# Patient Record
Sex: MALE | URBAN - METROPOLITAN AREA
[De-identification: names, ages, dates, MRNs, and addresses within clinical notes are randomized per-mention and may not be internally consistent; named-entity substitution may affect disease eponyms.]

---

## 2023-05-11 ENCOUNTER — TELEPHONE (OUTPATIENT)
Dept: SURGERY | Age: 23
End: 2023-05-11

## 2023-05-11 NOTE — TELEPHONE ENCOUNTER
Referral received from Dr. Rose Beatty of [facility] for pilonidal disease. Phone system is currently down so I am unable to reach out to patient at this time. Patient needs scheduled on a Wednesday in [place] when Dr. Monalisa Friedman is also in office.

## 2023-05-11 NOTE — TELEPHONE ENCOUNTER
I reached out to [address] [facility] ,with Dr. Steven Herman, and requested a referral and medical records to be faxed to us     Patient is being referred to us from [facility] for pilonidal sinus    Bryon advised that he will fax the referral and records to [phone]

## 2023-05-23 NOTE — PROGRESS NOTES
[facility]    CC: Pilonidal disease    Referring Physician: Maryam Valera MD    HPI: This is an 26 y.o.male with a PMHx as delineated below who presents to clinic in consultation for recalcitrant, pilonidal disease. He states that he has had it for decades, but notes that in the past several years, it has worsened and now bleeds often. He also has two separate openings. Given this, he was referred to colorectal surgery for evaluation and plastic surgery for assistance with reconstruction. PMHx: No past medical history on file. PSHx: No past surgical history on file. Allergy: Not on File    SHx:   Social History     Socioeconomic History    Marital status: Not on file     Spouse name: Not on file    Number of children: Not on file    Years of education: Not on file    Highest education level: Not on file   Occupational History    Not on file   Tobacco Use    Smoking status: Not on file    Smokeless tobacco: Not on file   Substance and Sexual Activity    Alcohol use: Not on file    Drug use: Not on file    Sexual activity: Not on file   Other Topics Concern    Not on file   Social History Narrative    Not on file     Social Determinants of Health     Financial Resource Strain: Not on file   Food Insecurity: Not on file   Transportation Needs: Not on file   Physical Activity: Not on file   Stress: Not on file   Social Connections: Not on file   Intimate Partner Violence: Not on file   Housing Stability: Not on file     FHx: Family history of skin CA: None    Meds:   No current outpatient medications on file. No current facility-administered medications for this visit. ROS   Constitutional: Negative for chills and fever.    Skin: See HPI    EXAM    BP (!) 140/79   Pulse 85   Temp 98.1 °F (36.7 °C)   Ht 5' 11\" (1.803 m)   Wt 253 lb (114.8 kg)   SpO2 98%   BMI 35.29 kg/m²     GEN: NAD, pleasant, healthy  CVS: RRR  PULM: No respiratory distress  HEENT: PERRLA/EOMI; hearing

## 2023-05-24 ENCOUNTER — OFFICE VISIT (OUTPATIENT)
Dept: SURGERY | Age: 23
End: 2023-05-24
Payer: COMMERCIAL

## 2023-05-24 VITALS
HEIGHT: 71 IN | BODY MASS INDEX: 35.42 KG/M2 | DIASTOLIC BLOOD PRESSURE: 79 MMHG | TEMPERATURE: 98.1 F | HEART RATE: 85 BPM | SYSTOLIC BLOOD PRESSURE: 140 MMHG | OXYGEN SATURATION: 98 % | WEIGHT: 253 LBS

## 2023-05-24 DIAGNOSIS — L98.8 PILONIDAL DISEASE: Primary | ICD-10-CM

## 2023-05-24 PROCEDURE — G8428 CUR MEDS NOT DOCUMENT: HCPCS | Performed by: SURGERY

## 2023-05-24 PROCEDURE — 99204 OFFICE O/P NEW MOD 45 MIN: CPT | Performed by: SURGERY

## 2023-05-24 PROCEDURE — 1036F TOBACCO NON-USER: CPT | Performed by: SURGERY

## 2023-05-24 PROCEDURE — G8417 CALC BMI ABV UP PARAM F/U: HCPCS | Performed by: SURGERY

## 2023-05-24 PROCEDURE — G8427 DOCREV CUR MEDS BY ELIG CLIN: HCPCS | Performed by: SURGERY

## 2023-05-24 NOTE — PROGRESS NOTES
[facility]  [address]  Dept: [phone]  Dept Fax: [phone]  Loc: [phone]    Visit Date: 5/24/2023    Ian Roa is a 26 y.o. male who presents today for: No chief complaint on file. HPI:       Ian Roa is a 26 y.o. male referred to me by Dr. Varsha Mckeon of GI for further evaluation guarding pilonidal disease. Leif Beal tells me he has had a pilonidal cyst/wound for several years. It occasionally bothers him with drainage, but is mostly asymptomatic. He is interested in discussing surgical intervention    Denies previous colonoscopy. Denies family history of colon cancer. Patient's problem list, medications, past medical, surgical, family, and social histories were reviewed and updated in the chart as indicated today. No past medical history on file. No past surgical history on file. Cancer-related family history is not on file. Social History:   Social History     Tobacco Use    Smoking status: Never    Smokeless tobacco: Never   Substance Use Topics    Alcohol use: Not on file      Tobacco cessation counseling provided as appropriate. REVIEW OF SYSTEMS:    Pertinent positives and negatives are mentioned in the HPI above. Otherwise, all other systems were reviewed and negative. Objective:     Physical Exam   There were no vitals taken for this visit. Constitutional: Appears well-developed and well-nourished. Grooming appropriate. No gross deformities. There is no height or weight on file to calculate BMI. Eyes: No scleral icterus. Conjunctiva/lids normal. Vision intact grossly. Pupils equal/symmetric, reactive bilaterally. ENT: External ears/nose without defect, scars, or masses. Hearing grossly intact. No facial deformity. Lips normal, normal dentition. Neck: No masses. Trachea midline. No crepitus. Thyroid not enlarged.   Cardiovascular: Normal